# Patient Record
Sex: MALE | Race: WHITE | NOT HISPANIC OR LATINO | URBAN - METROPOLITAN AREA
[De-identification: names, ages, dates, MRNs, and addresses within clinical notes are randomized per-mention and may not be internally consistent; named-entity substitution may affect disease eponyms.]

---

## 2020-05-22 ENCOUNTER — OFFICE (OUTPATIENT)
Dept: URBAN - METROPOLITAN AREA CLINIC 34 | Facility: CLINIC | Age: 80
End: 2020-05-22
Payer: COMMERCIAL

## 2020-05-22 VITALS — WEIGHT: 255 LBS | HEIGHT: 72 IN

## 2020-05-22 DIAGNOSIS — R19.4 CHANGE IN BOWEL HABIT: ICD-10-CM

## 2020-05-22 DIAGNOSIS — R07.89 OTHER CHEST PAIN: ICD-10-CM

## 2020-05-22 DIAGNOSIS — Z86.010 PERSONAL HISTORY OF COLONIC POLYPS: ICD-10-CM

## 2020-05-22 DIAGNOSIS — K21.9 GASTRO-ESOPHAGEAL REFLUX DISEASE WITHOUT ESOPHAGITIS: ICD-10-CM

## 2020-05-22 DIAGNOSIS — D50.9 IRON DEFICIENCY ANEMIA, UNSPECIFIED: ICD-10-CM

## 2020-05-22 PROCEDURE — 99203 OFFICE O/P NEW LOW 30 MIN: CPT | Mod: 95 | Performed by: INTERNAL MEDICINE

## 2020-05-22 NOTE — SERVICEHPINOTES
PATIENT VERIFIED BY DATE OF BIRTH AND NAME. Patient has been consented for this telecommunication visit. 80 yo male is referred for evaluation of anemia. He last had colonoscopy in 2013 and had hyperplastic polyps and diverticulosis. He has seen his PmD and noted to have chest pain and SOB that seems to improve with TUms and worse when bending over. He was referred for consideration of EGD and Nexium was increased to 40mg qd. He c/o heartburn and feels it in his throat. He saw Dr Cassidy a few weeks ago and was told everything ok. He notes constipation in the past but worse, does take Miralax and prune juice and Dulcolax every few days. He gets occ food feeling stuck or lump in throat.

## 2020-06-11 ENCOUNTER — ON CAMPUS - OUTPATIENT (OUTPATIENT)
Dept: URBAN - METROPOLITAN AREA HOSPITAL 37 | Facility: HOSPITAL | Age: 80
End: 2020-06-11
Payer: COMMERCIAL

## 2020-06-11 DIAGNOSIS — D50.9 IRON DEFICIENCY ANEMIA, UNSPECIFIED: ICD-10-CM

## 2020-06-11 DIAGNOSIS — K29.60 OTHER GASTRITIS WITHOUT BLEEDING: ICD-10-CM

## 2020-06-11 DIAGNOSIS — K21.9 GASTRO-ESOPHAGEAL REFLUX DISEASE WITHOUT ESOPHAGITIS: ICD-10-CM

## 2020-06-11 DIAGNOSIS — K31.89 OTHER DISEASES OF STOMACH AND DUODENUM: ICD-10-CM

## 2020-06-11 DIAGNOSIS — R07.89 OTHER CHEST PAIN: ICD-10-CM

## 2020-06-11 DIAGNOSIS — R19.4 CHANGE IN BOWEL HABIT: ICD-10-CM

## 2020-06-11 PROCEDURE — 43239 EGD BIOPSY SINGLE/MULTIPLE: CPT | Performed by: INTERNAL MEDICINE

## 2020-06-11 PROCEDURE — 45378 DIAGNOSTIC COLONOSCOPY: CPT | Performed by: INTERNAL MEDICINE

## 2020-06-30 ENCOUNTER — INPATIENT HOSPITAL (OUTPATIENT)
Dept: URBAN - METROPOLITAN AREA HOSPITAL 34 | Facility: HOSPITAL | Age: 80
End: 2020-06-30
Payer: COMMERCIAL

## 2020-06-30 DIAGNOSIS — R53.1 WEAKNESS: ICD-10-CM

## 2020-06-30 DIAGNOSIS — K21.9 GASTRO-ESOPHAGEAL REFLUX DISEASE WITHOUT ESOPHAGITIS: ICD-10-CM

## 2020-06-30 DIAGNOSIS — Z86.010 PERSONAL HISTORY OF COLONIC POLYPS: ICD-10-CM

## 2020-06-30 DIAGNOSIS — R06.02 SHORTNESS OF BREATH: ICD-10-CM

## 2020-06-30 DIAGNOSIS — K92.1 MELENA: ICD-10-CM

## 2020-06-30 DIAGNOSIS — D50.9 IRON DEFICIENCY ANEMIA, UNSPECIFIED: ICD-10-CM

## 2020-06-30 PROCEDURE — 99222 1ST HOSP IP/OBS MODERATE 55: CPT | Performed by: INTERNAL MEDICINE

## 2020-07-09 ENCOUNTER — OFFICE (OUTPATIENT)
Dept: URBAN - METROPOLITAN AREA CLINIC 34 | Facility: CLINIC | Age: 80
End: 2020-07-09
Payer: COMMERCIAL

## 2020-07-09 DIAGNOSIS — D50.9 IRON DEFICIENCY ANEMIA, UNSPECIFIED: ICD-10-CM

## 2020-07-09 PROCEDURE — 91110 GI TRC IMG INTRAL ESOPH-ILE: CPT | Performed by: INTERNAL MEDICINE

## 2020-09-28 ENCOUNTER — OFFICE (OUTPATIENT)
Dept: URBAN - METROPOLITAN AREA CLINIC 34 | Facility: CLINIC | Age: 80
End: 2020-09-28
Payer: COMMERCIAL

## 2020-09-28 VITALS
TEMPERATURE: 97.7 F | WEIGHT: 253.2 LBS | HEART RATE: 70 BPM | DIASTOLIC BLOOD PRESSURE: 78 MMHG | HEIGHT: 72 IN | SYSTOLIC BLOOD PRESSURE: 156 MMHG

## 2020-09-28 DIAGNOSIS — R19.4 CHANGE IN BOWEL HABIT: ICD-10-CM

## 2020-09-28 DIAGNOSIS — E53.8 DEFICIENCY OF OTHER SPECIFIED B GROUP VITAMINS: ICD-10-CM

## 2020-09-28 DIAGNOSIS — R07.89 OTHER CHEST PAIN: ICD-10-CM

## 2020-09-28 DIAGNOSIS — D50.9 IRON DEFICIENCY ANEMIA, UNSPECIFIED: ICD-10-CM

## 2020-09-28 DIAGNOSIS — K21.9 GASTRO-ESOPHAGEAL REFLUX DISEASE WITHOUT ESOPHAGITIS: ICD-10-CM

## 2020-09-28 PROCEDURE — 99214 OFFICE O/P EST MOD 30 MIN: CPT | Performed by: INTERNAL MEDICINE

## 2020-09-28 NOTE — SERVICEHPINOTES
CRISTAL SKY   is a   80   male who is here for follow up after capsule endoscopy for iron deficiency anemia. Capsule revealed no evidence of recent or active bleeding but he did have a few small red spots that could have been AVMs in the proximal small bowel and the distal small bowel. He states that iron levels and Hb levels have been going up and down. His B12 levels are also low which he thinks could be related to prior surgery involving ileum. He has dark stools from iron supplements but denies any melena or rectal bleeding. He denies any change in frequency of BMs. He has BM every other day typically and will Dulcolax if he needs to. He denies any nausea, vomiting, dysphagia. He has intermittent chest discomfort which he thinks might be related to reflux as it improves with tums (he is on PPI as well). However, he is wondering if it could be related to anemia. Prior cardio eval has not revealed any cardiac etiology for chest discomfort.

## 2021-01-05 ENCOUNTER — OFFICE (OUTPATIENT)
Dept: URBAN - METROPOLITAN AREA CLINIC 79 | Facility: CLINIC | Age: 81
End: 2021-01-05
Payer: COMMERCIAL

## 2021-01-05 VITALS
HEART RATE: 88 BPM | TEMPERATURE: 96.8 F | DIASTOLIC BLOOD PRESSURE: 61 MMHG | WEIGHT: 245 LBS | SYSTOLIC BLOOD PRESSURE: 160 MMHG | HEIGHT: 72 IN

## 2021-01-05 DIAGNOSIS — D50.9 IRON DEFICIENCY ANEMIA, UNSPECIFIED: ICD-10-CM

## 2021-01-05 DIAGNOSIS — K21.9 GASTRO-ESOPHAGEAL REFLUX DISEASE WITHOUT ESOPHAGITIS: ICD-10-CM

## 2021-01-05 PROCEDURE — 99214 OFFICE O/P EST MOD 30 MIN: CPT | Performed by: INTERNAL MEDICINE

## 2021-01-05 NOTE — SERVICEHPINOTES
CRISTAL SKY   is a   80   male who is here for follow up of iron deficiency anemia.  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 After last visit he had bloodwork to check on his blood counts and if he was still anemic he was more open to endoscopic eval. He has had recurrent drops in his Hb and iron levels since his last visit. He has required PRBC transfusions and iron infusions. He has dark stools from iron supplements but denies any melena or rectal bleeding. He denies any change in frequency of BMs. He uses Dulcolax if he needs to. He denies any nausea, vomiting, dysphagia. From last visit:MARU SKY is a 80 male who is here for follow up after capsule endoscopy for iron deficiency anemia. Capsule revealed no evidence of recent or active bleeding but he did have a few small red spots that could have been AVMs in the proximal small bowel and the distal small bowel. He states that iron levels and Hb levels have been going up and down. His B12 levels are also low which he thinks could be related to prior surgery involving ileum. He has dark stools from iron supplements but denies any melena or rectal bleeding. He denies any change in frequency of BMs. He has BM every other day typically and will Dulcolax if he needs to. He denies any nausea, vomiting, dysphagia. He has intermittent chest discomfort which he thinks might be related to reflux as it improves with tums (he is on PPI as well). However, he is wondering if it could be related to anemia. Prior cardio eval has not revealed any cardiac etiology for chest discomfort

## 2021-01-21 ENCOUNTER — ON CAMPUS - OUTPATIENT (OUTPATIENT)
Dept: URBAN - METROPOLITAN AREA HOSPITAL 16 | Facility: HOSPITAL | Age: 81
End: 2021-01-21
Payer: COMMERCIAL

## 2021-01-21 DIAGNOSIS — K31.7 POLYP OF STOMACH AND DUODENUM: ICD-10-CM

## 2021-01-21 DIAGNOSIS — D50.9 IRON DEFICIENCY ANEMIA, UNSPECIFIED: ICD-10-CM

## 2021-01-21 DIAGNOSIS — K21.9 GASTRO-ESOPHAGEAL REFLUX DISEASE WITHOUT ESOPHAGITIS: ICD-10-CM

## 2021-01-21 PROCEDURE — 43239 EGD BIOPSY SINGLE/MULTIPLE: CPT | Performed by: INTERNAL MEDICINE

## 2021-10-20 ENCOUNTER — OFFICE (OUTPATIENT)
Dept: URBAN - METROPOLITAN AREA CLINIC 79 | Facility: CLINIC | Age: 81
End: 2021-10-20
Payer: COMMERCIAL

## 2021-10-20 VITALS
TEMPERATURE: 97.1 F | HEART RATE: 73 BPM | DIASTOLIC BLOOD PRESSURE: 64 MMHG | WEIGHT: 249 LBS | SYSTOLIC BLOOD PRESSURE: 167 MMHG | HEIGHT: 72 IN

## 2021-10-20 DIAGNOSIS — D50.9 IRON DEFICIENCY ANEMIA, UNSPECIFIED: ICD-10-CM

## 2021-10-20 DIAGNOSIS — K59.09 OTHER CONSTIPATION: ICD-10-CM

## 2021-10-20 DIAGNOSIS — R93.2 ABNORMAL FINDINGS ON DIAGNOSTIC IMAGING OF LIVER AND BILIARY: ICD-10-CM

## 2021-10-20 PROCEDURE — 99214 OFFICE O/P EST MOD 30 MIN: CPT | Performed by: INTERNAL MEDICINE

## 2021-10-20 NOTE — SERVICEHPINOTES
CRISTAL SKY   is a   81   male with h/o bladder cancer with ileal conduit who complains of increased bowel sounds after eating and small pellet like stools. He also notes some mild pain/discomfort after eating. Small bowel series in August was largely unremarkable. He has seen Dr. Jasmin Duvall in the past. He denies any nausea, vomiting, melena, rectal bleeding, loss of appetite, dysphagia. Prior workup for iron deficiency anemia did not reveal a clear etiology (EGD, Colonoscopy, capsule endoscopy, enteroscopy). EGD and enteroscopy have revealed a submucosal nodule in stomach, possibly pancreatic rest. He was recommended to have EUS.  Of note CT in Feb 2021 revealed indeterminate liver lesion but he cannot get an MRI due to knee replacements and prior pacemaker.